# Patient Record
Sex: MALE | Race: BLACK OR AFRICAN AMERICAN
[De-identification: names, ages, dates, MRNs, and addresses within clinical notes are randomized per-mention and may not be internally consistent; named-entity substitution may affect disease eponyms.]

---

## 2022-03-19 PROBLEM — G89.29 CHRONIC BILATERAL LOW BACK PAIN WITH BILATERAL SCIATICA: Status: ACTIVE | Noted: 2021-09-27

## 2022-03-19 PROBLEM — M54.42 CHRONIC BILATERAL LOW BACK PAIN WITH BILATERAL SCIATICA: Status: ACTIVE | Noted: 2021-09-27

## 2022-03-19 PROBLEM — E11.9 CONTROLLED TYPE 2 DIABETES MELLITUS WITHOUT COMPLICATION, WITHOUT LONG-TERM CURRENT USE OF INSULIN (HCC): Status: ACTIVE | Noted: 2021-09-27

## 2022-03-19 PROBLEM — R73.01 IMPAIRED FASTING BLOOD SUGAR: Status: ACTIVE | Noted: 2019-03-16

## 2022-03-19 PROBLEM — N18.2 STAGE 2 CHRONIC KIDNEY DISEASE: Status: ACTIVE | Noted: 2019-03-16

## 2022-03-19 PROBLEM — R60.9 PERIPHERAL EDEMA: Status: ACTIVE | Noted: 2018-04-24

## 2022-03-19 PROBLEM — R60.0 PERIPHERAL EDEMA: Status: ACTIVE | Noted: 2018-04-24

## 2022-03-19 PROBLEM — M54.41 CHRONIC BILATERAL LOW BACK PAIN WITH BILATERAL SCIATICA: Status: ACTIVE | Noted: 2021-09-27

## 2022-03-19 PROBLEM — E66.01 OBESITY, MORBID (HCC): Status: ACTIVE | Noted: 2018-04-24

## 2022-03-19 PROBLEM — I10 ESSENTIAL HYPERTENSION: Status: ACTIVE | Noted: 2018-04-24

## 2022-03-20 PROBLEM — N52.01 ERECTILE DYSFUNCTION DUE TO ARTERIAL INSUFFICIENCY: Status: ACTIVE | Noted: 2019-03-16

## 2022-03-28 DIAGNOSIS — Z12.11 SPECIAL SCREENING FOR MALIGNANT NEOPLASM OF COLON: Primary | ICD-10-CM

## 2022-07-18 RX ORDER — LOSARTAN POTASSIUM 100 MG/1
TABLET ORAL
Qty: 90 TABLET | OUTPATIENT
Start: 2022-07-18

## 2022-09-20 DIAGNOSIS — I10 ESSENTIAL (PRIMARY) HYPERTENSION: ICD-10-CM

## 2022-09-20 DIAGNOSIS — Z13.6 ENCOUNTER FOR LIPID SCREENING FOR CARDIOVASCULAR DISEASE: ICD-10-CM

## 2022-09-20 DIAGNOSIS — Z13.220 ENCOUNTER FOR LIPID SCREENING FOR CARDIOVASCULAR DISEASE: ICD-10-CM

## 2022-09-20 DIAGNOSIS — E11.9 TYPE 2 DIABETES MELLITUS WITHOUT COMPLICATION, WITHOUT LONG-TERM CURRENT USE OF INSULIN (HCC): Primary | ICD-10-CM

## 2022-11-03 DIAGNOSIS — I10 ESSENTIAL (PRIMARY) HYPERTENSION: ICD-10-CM

## 2022-11-03 DIAGNOSIS — Z13.6 ENCOUNTER FOR LIPID SCREENING FOR CARDIOVASCULAR DISEASE: ICD-10-CM

## 2022-11-03 DIAGNOSIS — Z13.220 ENCOUNTER FOR LIPID SCREENING FOR CARDIOVASCULAR DISEASE: ICD-10-CM

## 2022-11-03 DIAGNOSIS — E11.9 TYPE 2 DIABETES MELLITUS WITHOUT COMPLICATION, WITHOUT LONG-TERM CURRENT USE OF INSULIN (HCC): ICD-10-CM

## 2022-11-04 LAB
ALBUMIN SERPL-MCNC: 3.6 G/DL (ref 3.5–5)
ALBUMIN/GLOB SERPL: 0.9 {RATIO} (ref 0.4–1.6)
ALP SERPL-CCNC: 67 U/L (ref 50–136)
ALT SERPL-CCNC: 24 U/L (ref 12–65)
ANION GAP SERPL CALC-SCNC: 8 MMOL/L (ref 2–11)
AST SERPL-CCNC: 15 U/L (ref 15–37)
BASOPHILS # BLD: 0.1 K/UL (ref 0–0.2)
BASOPHILS NFR BLD: 1 % (ref 0–2)
BILIRUB SERPL-MCNC: 0.4 MG/DL (ref 0.2–1.1)
BUN SERPL-MCNC: 14 MG/DL (ref 6–23)
CALCIUM SERPL-MCNC: 9.4 MG/DL (ref 8.3–10.4)
CHLORIDE SERPL-SCNC: 107 MMOL/L (ref 101–110)
CHOLEST SERPL-MCNC: 156 MG/DL
CO2 SERPL-SCNC: 25 MMOL/L (ref 21–32)
CREAT SERPL-MCNC: 1.2 MG/DL (ref 0.8–1.5)
DIFFERENTIAL METHOD BLD: ABNORMAL
EOSINOPHIL # BLD: 0.3 K/UL (ref 0–0.8)
EOSINOPHIL NFR BLD: 2 % (ref 0.5–7.8)
ERYTHROCYTE [DISTWIDTH] IN BLOOD BY AUTOMATED COUNT: 12.4 % (ref 11.9–14.6)
EST. AVERAGE GLUCOSE BLD GHB EST-MCNC: 111 MG/DL
GLOBULIN SER CALC-MCNC: 3.9 G/DL (ref 2.8–4.5)
GLUCOSE SERPL-MCNC: 127 MG/DL (ref 65–100)
HBA1C MFR BLD: 5.5 % (ref 4.8–5.6)
HCT VFR BLD AUTO: 52.5 % (ref 41.1–50.3)
HDLC SERPL-MCNC: 34 MG/DL (ref 40–60)
HDLC SERPL: 4.6 {RATIO}
HGB BLD-MCNC: 15.7 G/DL (ref 13.6–17.2)
IMM GRANULOCYTES # BLD AUTO: 0 K/UL (ref 0–0.5)
IMM GRANULOCYTES NFR BLD AUTO: 0 % (ref 0–5)
LDLC SERPL CALC-MCNC: 101 MG/DL
LYMPHOCYTES # BLD: 2.7 K/UL (ref 0.5–4.6)
LYMPHOCYTES NFR BLD: 27 % (ref 13–44)
MCH RBC QN AUTO: 28.7 PG (ref 26.1–32.9)
MCHC RBC AUTO-ENTMCNC: 29.9 G/DL (ref 31.4–35)
MCV RBC AUTO: 96 FL (ref 82–102)
MONOCYTES # BLD: 0.6 K/UL (ref 0.1–1.3)
MONOCYTES NFR BLD: 6 % (ref 4–12)
NEUTS SEG # BLD: 6.6 K/UL (ref 1.7–8.2)
NEUTS SEG NFR BLD: 64 % (ref 43–78)
NRBC # BLD: 0 K/UL (ref 0–0.2)
PLATELET # BLD AUTO: 279 K/UL (ref 150–450)
PMV BLD AUTO: 11.4 FL (ref 9.4–12.3)
POTASSIUM SERPL-SCNC: 4.1 MMOL/L (ref 3.5–5.1)
PROT SERPL-MCNC: 7.5 G/DL (ref 6.3–8.2)
RBC # BLD AUTO: 5.47 M/UL (ref 4.23–5.6)
SODIUM SERPL-SCNC: 140 MMOL/L (ref 133–143)
TRIGL SERPL-MCNC: 105 MG/DL (ref 35–150)
VLDLC SERPL CALC-MCNC: 21 MG/DL (ref 6–23)
WBC # BLD AUTO: 10.2 K/UL (ref 4.3–11.1)

## 2022-11-28 RX ORDER — LOSARTAN POTASSIUM 100 MG/1
TABLET ORAL
Qty: 90 TABLET | Refills: 0 | Status: SHIPPED | OUTPATIENT
Start: 2022-11-28

## 2022-12-29 RX ORDER — METFORMIN HYDROCHLORIDE 750 MG/1
TABLET, EXTENDED RELEASE ORAL
Qty: 60 TABLET | OUTPATIENT
Start: 2022-12-29

## 2023-02-21 ENCOUNTER — TELEPHONE (OUTPATIENT)
Dept: PRIMARY CARE CLINIC | Facility: CLINIC | Age: 53
End: 2023-02-21

## 2023-02-21 RX ORDER — LOSARTAN POTASSIUM 100 MG/1
TABLET ORAL
Qty: 90 TABLET | Refills: 0 | OUTPATIENT
Start: 2023-02-21

## 2023-02-21 RX ORDER — TADALAFIL 20 MG/1
20 TABLET ORAL PRN
Qty: 30 TABLET | Refills: 2 | Status: SHIPPED | OUTPATIENT
Start: 2023-02-21

## 2023-02-21 NOTE — TELEPHONE ENCOUNTER
----- Message from Corey Dixon sent at 2/17/2023 10:36 PM EST -----  Regarding: Generic cilas  Generic

## 2023-02-21 NOTE — TELEPHONE ENCOUNTER
----- Message from Ernie Mckeon sent at 2/17/2023 10:36 PM EST -----  Regarding: Generic cilas  Generic

## 2023-04-20 RX ORDER — LOSARTAN POTASSIUM 100 MG/1
TABLET ORAL
Qty: 90 TABLET | Refills: 0 | Status: SHIPPED | OUTPATIENT
Start: 2023-04-20

## 2023-07-24 RX ORDER — LOSARTAN POTASSIUM 100 MG/1
TABLET ORAL
Qty: 90 TABLET | Refills: 0 | OUTPATIENT
Start: 2023-07-24

## 2023-09-20 RX ORDER — AMLODIPINE BESYLATE 10 MG/1
TABLET ORAL
Qty: 90 TABLET | OUTPATIENT
Start: 2023-09-20

## 2023-09-28 ENCOUNTER — TELEPHONE (OUTPATIENT)
Dept: PRIMARY CARE CLINIC | Facility: CLINIC | Age: 53
End: 2023-09-28

## 2023-09-28 NOTE — TELEPHONE ENCOUNTER
Patient came into the office he stated that he is out of losartan. Patient missed two appointments, but he is scheduled for January. Patient would also like to have labs. He can be reached at [phone]. Please give him a call.

## 2023-10-02 ENCOUNTER — NURSE ONLY (OUTPATIENT)
Dept: PRIMARY CARE CLINIC | Facility: CLINIC | Age: 53
End: 2023-10-02

## 2023-10-02 DIAGNOSIS — R73.01 IMPAIRED FASTING BLOOD SUGAR: ICD-10-CM

## 2023-10-02 DIAGNOSIS — E66.01 MORBID (SEVERE) OBESITY DUE TO EXCESS CALORIES (HCC): ICD-10-CM

## 2023-10-02 DIAGNOSIS — E66.01 OBESITY, MORBID (HCC): ICD-10-CM

## 2023-10-02 DIAGNOSIS — E11.9 CONTROLLED TYPE 2 DIABETES MELLITUS WITHOUT COMPLICATION, WITHOUT LONG-TERM CURRENT USE OF INSULIN (HCC): ICD-10-CM

## 2023-10-02 DIAGNOSIS — N18.2 STAGE 2 CHRONIC KIDNEY DISEASE: ICD-10-CM

## 2023-10-02 DIAGNOSIS — I10 ESSENTIAL HYPERTENSION: Primary | ICD-10-CM

## 2023-10-02 DIAGNOSIS — I10 ESSENTIAL HYPERTENSION: ICD-10-CM

## 2023-10-02 LAB
ALBUMIN SERPL-MCNC: 3.4 G/DL (ref 3.5–5)
ALBUMIN/GLOB SERPL: 0.7 (ref 0.4–1.6)
ALP SERPL-CCNC: 66 U/L (ref 50–136)
ALT SERPL-CCNC: 28 U/L (ref 12–65)
ANION GAP SERPL CALC-SCNC: 6 MMOL/L (ref 2–11)
AST SERPL-CCNC: 20 U/L (ref 15–37)
BASOPHILS # BLD: 0.1 K/UL (ref 0–0.2)
BASOPHILS NFR BLD: 1 % (ref 0–2)
BILIRUB SERPL-MCNC: 0.5 MG/DL (ref 0.2–1.1)
BUN SERPL-MCNC: 8 MG/DL (ref 6–23)
CALCIUM SERPL-MCNC: 8.9 MG/DL (ref 8.3–10.4)
CHLORIDE SERPL-SCNC: 108 MMOL/L (ref 101–110)
CHOLEST SERPL-MCNC: 172 MG/DL
CO2 SERPL-SCNC: 26 MMOL/L (ref 21–32)
CREAT SERPL-MCNC: 1.2 MG/DL (ref 0.8–1.5)
CREAT UR-MCNC: 120 MG/DL
DIFFERENTIAL METHOD BLD: ABNORMAL
EOSINOPHIL # BLD: 0.3 K/UL (ref 0–0.8)
EOSINOPHIL NFR BLD: 3 % (ref 0.5–7.8)
ERYTHROCYTE [DISTWIDTH] IN BLOOD BY AUTOMATED COUNT: 11.6 % (ref 11.9–14.6)
EST. AVERAGE GLUCOSE BLD GHB EST-MCNC: 126 MG/DL
GLOBULIN SER CALC-MCNC: 4.9 G/DL (ref 2.8–4.5)
GLUCOSE SERPL-MCNC: 142 MG/DL (ref 65–100)
HBA1C MFR BLD: 6 % (ref 4.8–5.6)
HCT VFR BLD AUTO: 47.9 % (ref 41.1–50.3)
HDLC SERPL-MCNC: 30 MG/DL (ref 40–60)
HDLC SERPL: 5.7
HGB BLD-MCNC: 15.6 G/DL (ref 13.6–17.2)
IMM GRANULOCYTES # BLD AUTO: 0 K/UL (ref 0–0.5)
IMM GRANULOCYTES NFR BLD AUTO: 0 % (ref 0–5)
LDLC SERPL CALC-MCNC: 114.2 MG/DL
LYMPHOCYTES # BLD: 2.1 K/UL (ref 0.5–4.6)
LYMPHOCYTES NFR BLD: 22 % (ref 13–44)
MCH RBC QN AUTO: 29.2 PG (ref 26.1–32.9)
MCHC RBC AUTO-ENTMCNC: 32.6 G/DL (ref 31.4–35)
MCV RBC AUTO: 89.5 FL (ref 82–102)
MICROALBUMIN UR-MCNC: 1.06 MG/DL
MICROALBUMIN/CREAT UR-RTO: 9 MG/G (ref 0–30)
MONOCYTES # BLD: 0.5 K/UL (ref 0.1–1.3)
MONOCYTES NFR BLD: 5 % (ref 4–12)
NEUTS SEG # BLD: 6.8 K/UL (ref 1.7–8.2)
NEUTS SEG NFR BLD: 69 % (ref 43–78)
NRBC # BLD: 0 K/UL (ref 0–0.2)
PLATELET # BLD AUTO: 291 K/UL (ref 150–450)
PMV BLD AUTO: 11.2 FL (ref 9.4–12.3)
POTASSIUM SERPL-SCNC: 3.8 MMOL/L (ref 3.5–5.1)
PROT SERPL-MCNC: 8.3 G/DL (ref 6.3–8.2)
RBC # BLD AUTO: 5.35 M/UL (ref 4.23–5.6)
SODIUM SERPL-SCNC: 140 MMOL/L (ref 133–143)
TRIGL SERPL-MCNC: 139 MG/DL (ref 35–150)
VLDLC SERPL CALC-MCNC: 27.8 MG/DL (ref 6–23)
WBC # BLD AUTO: 9.8 K/UL (ref 4.3–11.1)

## 2024-01-02 RX ORDER — LOSARTAN POTASSIUM 100 MG/1
100 TABLET ORAL DAILY
Qty: 90 TABLET | Refills: 0 | Status: SHIPPED | OUTPATIENT
Start: 2024-01-02

## 2024-01-10 ENCOUNTER — OFFICE VISIT (OUTPATIENT)
Dept: PRIMARY CARE CLINIC | Facility: CLINIC | Age: 54
End: 2024-01-10
Payer: MEDICARE

## 2024-01-10 VITALS
HEIGHT: 70 IN | DIASTOLIC BLOOD PRESSURE: 105 MMHG | TEMPERATURE: 97.2 F | SYSTOLIC BLOOD PRESSURE: 184 MMHG | OXYGEN SATURATION: 95 % | BODY MASS INDEX: 42.52 KG/M2 | HEART RATE: 96 BPM | WEIGHT: 297 LBS

## 2024-01-10 DIAGNOSIS — N18.2 STAGE 2 CHRONIC KIDNEY DISEASE: ICD-10-CM

## 2024-01-10 DIAGNOSIS — M54.41 CHRONIC BILATERAL LOW BACK PAIN WITH BILATERAL SCIATICA: ICD-10-CM

## 2024-01-10 DIAGNOSIS — Z12.5 PROSTATE CANCER SCREENING: ICD-10-CM

## 2024-01-10 DIAGNOSIS — J10.1 INFLUENZA B: ICD-10-CM

## 2024-01-10 DIAGNOSIS — E11.9 CONTROLLED TYPE 2 DIABETES MELLITUS WITHOUT COMPLICATION, WITHOUT LONG-TERM CURRENT USE OF INSULIN (HCC): ICD-10-CM

## 2024-01-10 DIAGNOSIS — J06.9 ACUTE URI: ICD-10-CM

## 2024-01-10 DIAGNOSIS — G89.29 CHRONIC BILATERAL LOW BACK PAIN WITH BILATERAL SCIATICA: ICD-10-CM

## 2024-01-10 DIAGNOSIS — M54.42 CHRONIC BILATERAL LOW BACK PAIN WITH BILATERAL SCIATICA: ICD-10-CM

## 2024-01-10 DIAGNOSIS — I10 ESSENTIAL HYPERTENSION: ICD-10-CM

## 2024-01-10 DIAGNOSIS — E66.01 OBESITY, MORBID (HCC): ICD-10-CM

## 2024-01-10 DIAGNOSIS — Z00.00 MEDICARE ANNUAL WELLNESS VISIT, SUBSEQUENT: Primary | ICD-10-CM

## 2024-01-10 LAB
ALBUMIN SERPL-MCNC: 3.4 G/DL (ref 3.5–5)
ALBUMIN/GLOB SERPL: 0.7 (ref 0.4–1.6)
ALP SERPL-CCNC: 71 U/L (ref 50–136)
ALT SERPL-CCNC: 33 U/L (ref 12–65)
ANION GAP SERPL CALC-SCNC: 8 MMOL/L (ref 2–11)
AST SERPL-CCNC: 23 U/L (ref 15–37)
BASOPHILS # BLD: 0 K/UL (ref 0–0.2)
BASOPHILS NFR BLD: 1 % (ref 0–2)
BILIRUB SERPL-MCNC: 0.9 MG/DL (ref 0.2–1.1)
BUN SERPL-MCNC: 11 MG/DL (ref 6–23)
CALCIUM SERPL-MCNC: 8.8 MG/DL (ref 8.3–10.4)
CHLORIDE SERPL-SCNC: 106 MMOL/L (ref 103–113)
CHOLEST SERPL-MCNC: 151 MG/DL
CO2 SERPL-SCNC: 21 MMOL/L (ref 21–32)
CREAT SERPL-MCNC: 1.4 MG/DL (ref 0.8–1.5)
DIFFERENTIAL METHOD BLD: NORMAL
EOSINOPHIL # BLD: 0.1 K/UL (ref 0–0.8)
EOSINOPHIL NFR BLD: 2 % (ref 0.5–7.8)
ERYTHROCYTE [DISTWIDTH] IN BLOOD BY AUTOMATED COUNT: 12 % (ref 11.9–14.6)
EXP DATE SOLUTION: NORMAL
EXP DATE SWAB: NORMAL
EXPIRATION DATE: NORMAL
GLOBULIN SER CALC-MCNC: 5.2 G/DL (ref 2.8–4.5)
GLUCOSE SERPL-MCNC: 162 MG/DL (ref 65–100)
HCT VFR BLD AUTO: 46.4 % (ref 41.1–50.3)
HDLC SERPL: 5.6
HGB BLD-MCNC: 15.1 G/DL (ref 13.6–17.2)
IMM GRANULOCYTES # BLD AUTO: 0 K/UL (ref 0–0.5)
IMM GRANULOCYTES NFR BLD AUTO: 0 % (ref 0–5)
INFLUENZA A ANTIGEN, POC: NEGATIVE
INFLUENZA B ANTIGEN, POC: POSITIVE
LDLC SERPL CALC-MCNC: 102.6 MG/DL
LOT NUMBER POC: NORMAL
LOT NUMBER SOLUTION: NORMAL
LOT NUMBER SWAB: NORMAL
LYMPHOCYTES # BLD: 1.2 K/UL (ref 0.5–4.6)
LYMPHOCYTES NFR BLD: 16 % (ref 13–44)
MCH RBC QN AUTO: 29 PG (ref 26.1–32.9)
MCHC RBC AUTO-ENTMCNC: 32.5 G/DL (ref 31.4–35)
MCV RBC AUTO: 89.2 FL (ref 82–102)
MONOCYTES # BLD: 0.7 K/UL (ref 0.1–1.3)
MONOCYTES NFR BLD: 8 % (ref 4–12)
NEUTS SEG # BLD: 5.7 K/UL (ref 1.7–8.2)
NEUTS SEG NFR BLD: 73 % (ref 43–78)
NRBC # BLD: 0 K/UL (ref 0–0.2)
PLATELET # BLD AUTO: 241 K/UL (ref 150–450)
PMV BLD AUTO: 11 FL (ref 9.4–12.3)
POTASSIUM SERPL-SCNC: 3.8 MMOL/L (ref 3.5–5.1)
PROT SERPL-MCNC: 8.6 G/DL (ref 6.3–8.2)
PSA SERPL-MCNC: 1.6 NG/ML
RBC # BLD AUTO: 5.2 M/UL (ref 4.23–5.6)
SARS-COV-2 RNA, POC: NEGATIVE
SODIUM SERPL-SCNC: 135 MMOL/L (ref 136–146)
TRIGL SERPL-MCNC: 107 MG/DL (ref 35–150)
VALID INTERNAL CONTROL, POC: ABNORMAL
VLDLC SERPL CALC-MCNC: 21.4 MG/DL (ref 6–23)
WBC # BLD AUTO: 7.7 K/UL (ref 4.3–11.1)

## 2024-01-10 PROCEDURE — G0439 PPPS, SUBSEQ VISIT: HCPCS | Performed by: FAMILY MEDICINE

## 2024-01-10 PROCEDURE — 87804 INFLUENZA ASSAY W/OPTIC: CPT | Performed by: FAMILY MEDICINE

## 2024-01-10 PROCEDURE — 87635 SARS-COV-2 COVID-19 AMP PRB: CPT | Performed by: FAMILY MEDICINE

## 2024-01-10 PROCEDURE — 99213 OFFICE O/P EST LOW 20 MIN: CPT | Performed by: FAMILY MEDICINE

## 2024-01-10 PROCEDURE — 3080F DIAST BP >= 90 MM HG: CPT | Performed by: FAMILY MEDICINE

## 2024-01-10 PROCEDURE — 3077F SYST BP >= 140 MM HG: CPT | Performed by: FAMILY MEDICINE

## 2024-01-10 RX ORDER — LOSARTAN POTASSIUM 100 MG/1
100 TABLET ORAL DAILY
Qty: 90 TABLET | Refills: 1 | Status: SHIPPED | OUTPATIENT
Start: 2024-01-10

## 2024-01-10 RX ORDER — OSELTAMIVIR PHOSPHATE 75 MG/1
75 CAPSULE ORAL 2 TIMES DAILY
Qty: 10 CAPSULE | Refills: 0 | Status: SHIPPED | OUTPATIENT
Start: 2024-01-10 | End: 2024-01-15

## 2024-01-10 RX ORDER — AMLODIPINE BESYLATE 10 MG/1
10 TABLET ORAL DAILY
Qty: 90 TABLET | Refills: 1 | Status: SHIPPED | OUTPATIENT
Start: 2024-01-10

## 2024-01-10 ASSESSMENT — PATIENT HEALTH QUESTIONNAIRE - PHQ9
SUM OF ALL RESPONSES TO PHQ QUESTIONS 1-9: 0
SUM OF ALL RESPONSES TO PHQ QUESTIONS 1-9: 0
1. LITTLE INTEREST OR PLEASURE IN DOING THINGS: 0
2. FEELING DOWN, DEPRESSED OR HOPELESS: 0
SUM OF ALL RESPONSES TO PHQ9 QUESTIONS 1 & 2: 0
SUM OF ALL RESPONSES TO PHQ QUESTIONS 1-9: 0
SUM OF ALL RESPONSES TO PHQ QUESTIONS 1-9: 0

## 2024-01-10 ASSESSMENT — LIFESTYLE VARIABLES
HOW OFTEN DO YOU HAVE A DRINK CONTAINING ALCOHOL: NEVER
HOW MANY STANDARD DRINKS CONTAINING ALCOHOL DO YOU HAVE ON A TYPICAL DAY: PATIENT DOES NOT DRINK

## 2024-01-10 NOTE — PROGRESS NOTES
Medicare Annual Wellness Visit    Soto Townsend is here for Medicare AWV (Patient is here for a Medicare Wellness.), Hypertension (Patient is here to follow up and get refills.), Erectile Dysfunction (Patient would like to get a prescription for /sildenafil instead of Cialis.), Labs Only (Patient is fasting for labs.), and URI (Patient has a running nose, coughing and sneezing since yesterday. Patient has been taking Coricidin cough medication, with no relief.)    Assessment & Plan   Medicare annual wellness visit, subsequent  Essential hypertension  -     losartan (COZAAR) 100 MG tablet; Take 1 tablet by mouth daily, Disp-90 tablet, R-1Normal  -     amLODIPine (NORVASC) 10 MG tablet; Take 1 tablet by mouth daily TAKE 1 TABLET BY MOUTH DAILY, Disp-90 tablet, R-1Normal  -     CBC with Auto Differential; Future  -     Comprehensive Metabolic Panel; Future  -     Lipid Panel; Future  Controlled type 2 diabetes mellitus without complication, without long-term current use of insulin (HCC)  -     Hemoglobin A1C; Future  -     Lipid Panel; Future  Stage 2 chronic kidney disease  Influenza B  -     oseltamivir (TAMIFLU) 75 MG capsule; Take 1 capsule by mouth 2 times daily for 5 days, Disp-10 capsule, R-0Normal  Chronic bilateral low back pain with bilateral sciatica  Obesity, morbid (HCC)  Acute URI  -     AMB POC RAPID INFLUENZA TEST  -     AMB POC COVID-19 COV  Prostate cancer screening  -     PSA Screening; Future      We are checking labs today.  Will follow-up.  To review and make changes to medications.  Blood pressure is quite elevated.  It is unclear if this is due to cough medications or if his blood pressure is truly elevated.  He will discontinue over-the-counter cough medications.  Will take losartan and amlodipine as prescribed and recheck his blood pressure and 4 weeks.  Patient is positive for influenza A.  I am sending in Tamiflu.  Will treat otherwise symptomatically.  Call if symptoms do not

## 2024-01-10 NOTE — PATIENT INSTRUCTIONS
home?  Keeping your body active can help slow MCI. Exercises like walking can help. Try to stay active mentally too. Read or do things like crossword puzzles if you enjoy doing them.  If you need help coping with MCI, you may want to get support from family, friends, a support group, or a counselor who works with people who have MCI.  Though the future isn't always clear, it can be good to plan ahead with instructions for your care. These are called advanced directives. Having a plan can help make sure that you get the care you want.  Current as of: May 1, 2023               Content Version: 13.9  © 2006-2023 [employer].   Care instructions adapted under license by [employer]. If you have questions about a medical condition or this instruction, always ask your healthcare professional. [employer] disclaims any warranty or liability for your use of this information.           Starting a Weight Loss Plan: Care Instructions  Overview     If you're thinking about losing weight, it can be hard to know where to start. Your doctor can help you set up a weight loss plan that best meets your needs. You may want to take a class on nutrition or exercise, or you could join a weight loss support group. If you have questions about how to make changes to your eating or exercise habits, ask your doctor about seeing a registered dietitian or an exercise specialist.  It can be a big challenge to lose weight. But you don't have to make huge changes at once. Make small changes, and stick with them. When those changes become habit, add a few more changes.  If you don't think you're ready to make changes right now, try to pick a date in the future. Make an appointment to see your doctor to discuss whether the time is right for you to start a plan.  Follow-up care is a key part of your treatment and safety. Be sure to make and go to all appointments, and call your doctor if you are having problems. It's also a

## 2024-01-11 LAB
EST. AVERAGE GLUCOSE BLD GHB EST-MCNC: 140 MG/DL
HBA1C MFR BLD: 6.5 % (ref 4.8–5.6)

## 2024-03-07 ENCOUNTER — HOSPITAL ENCOUNTER (OUTPATIENT)
Dept: GENERAL RADIOLOGY | Age: 54
Discharge: HOME | End: 2024-03-07
Payer: MEDICARE

## 2024-03-07 ENCOUNTER — OFFICE VISIT (OUTPATIENT)
Dept: PRIMARY CARE CLINIC | Facility: CLINIC | Age: 54
End: 2024-03-07
Payer: MEDICARE

## 2024-03-07 VITALS
HEART RATE: 77 BPM | TEMPERATURE: 98.4 F | HEIGHT: 70 IN | SYSTOLIC BLOOD PRESSURE: 155 MMHG | WEIGHT: 299 LBS | OXYGEN SATURATION: 98 % | DIASTOLIC BLOOD PRESSURE: 87 MMHG | BODY MASS INDEX: 42.8 KG/M2

## 2024-03-07 DIAGNOSIS — I10 ESSENTIAL HYPERTENSION: ICD-10-CM

## 2024-03-07 DIAGNOSIS — R05.3 CHRONIC COUGH: Primary | ICD-10-CM

## 2024-03-07 DIAGNOSIS — R05.3 CHRONIC COUGH: ICD-10-CM

## 2024-03-07 DIAGNOSIS — Z12.11 SCREENING FOR MALIGNANT NEOPLASM OF COLON: ICD-10-CM

## 2024-03-07 DIAGNOSIS — N52.01 ERECTILE DYSFUNCTION DUE TO ARTERIAL INSUFFICIENCY: ICD-10-CM

## 2024-03-07 PROCEDURE — 71046 X-RAY EXAM CHEST 2 VIEWS: CPT

## 2024-03-07 PROCEDURE — 3079F DIAST BP 80-89 MM HG: CPT | Performed by: FAMILY MEDICINE

## 2024-03-07 PROCEDURE — 99214 OFFICE O/P EST MOD 30 MIN: CPT | Performed by: FAMILY MEDICINE

## 2024-03-07 PROCEDURE — 3077F SYST BP >= 140 MM HG: CPT | Performed by: FAMILY MEDICINE

## 2024-03-07 RX ORDER — M-VIT,TX,IRON,MINS/CALC/FOLIC 27MG-0.4MG
1 TABLET ORAL DAILY
COMMUNITY

## 2024-03-07 RX ORDER — CODEINE PHOSPHATE/GUAIFENESIN 10-100MG/5
5 LIQUID (ML) ORAL 2 TIMES DAILY PRN
Qty: 120 ML | Refills: 0 | Status: SHIPPED | OUTPATIENT
Start: 2024-03-07 | End: 2024-03-19

## 2024-03-07 RX ORDER — AZITHROMYCIN 250 MG/1
250 TABLET, FILM COATED ORAL SEE ADMIN INSTRUCTIONS
Qty: 6 TABLET | Refills: 0 | Status: SHIPPED | OUTPATIENT
Start: 2024-03-07 | End: 2024-03-12

## 2024-03-07 RX ORDER — TADALAFIL 20 MG/1
20 TABLET ORAL PRN
Qty: 10 TABLET | Refills: 5 | Status: SHIPPED | OUTPATIENT
Start: 2024-03-07

## 2024-03-07 RX ORDER — LOSARTAN POTASSIUM AND HYDROCHLOROTHIAZIDE 25; 100 MG/1; MG/1
1 TABLET ORAL DAILY
Qty: 30 TABLET | Refills: 5 | Status: SHIPPED | OUTPATIENT
Start: 2024-03-07

## 2024-03-07 SDOH — ECONOMIC STABILITY: INCOME INSECURITY: HOW HARD IS IT FOR YOU TO PAY FOR THE VERY BASICS LIKE FOOD, HOUSING, MEDICAL CARE, AND HEATING?: PATIENT DECLINED

## 2024-03-07 SDOH — ECONOMIC STABILITY: HOUSING INSECURITY
IN THE LAST 12 MONTHS, WAS THERE A TIME WHEN YOU DID NOT HAVE A STEADY PLACE TO SLEEP OR SLEPT IN A SHELTER (INCLUDING NOW)?: PATIENT DECLINED

## 2024-03-07 SDOH — ECONOMIC STABILITY: FOOD INSECURITY: WITHIN THE PAST 12 MONTHS, YOU WORRIED THAT YOUR FOOD WOULD RUN OUT BEFORE YOU GOT MONEY TO BUY MORE.: PATIENT DECLINED

## 2024-03-07 SDOH — ECONOMIC STABILITY: FOOD INSECURITY: WITHIN THE PAST 12 MONTHS, THE FOOD YOU BOUGHT JUST DIDN'T LAST AND YOU DIDN'T HAVE MONEY TO GET MORE.: PATIENT DECLINED

## 2024-03-07 ASSESSMENT — PATIENT HEALTH QUESTIONNAIRE - PHQ9
2. FEELING DOWN, DEPRESSED OR HOPELESS: 0
SUM OF ALL RESPONSES TO PHQ QUESTIONS 1-9: 0
SUM OF ALL RESPONSES TO PHQ9 QUESTIONS 1 & 2: 0
1. LITTLE INTEREST OR PLEASURE IN DOING THINGS: 0
SUM OF ALL RESPONSES TO PHQ QUESTIONS 1-9: 0

## 2024-03-07 ASSESSMENT — ENCOUNTER SYMPTOMS: COUGH: 1

## 2024-03-07 NOTE — PROGRESS NOTES
Soto Townsend (:  1970) is a 53 y.o. male,Established patient, here for evaluation of the following chief complaint(s):  Cough         ASSESSMENT/PLAN:  1. Chronic cough  -     XR CHEST PA LAT (2 VIEWS); Future  -     guaiFENesin-codeine (TUSSI-ORGANIDIN NR) 100-10 MG/5ML syrup; Take 5 mLs by mouth 2 times daily as needed for Cough for up to 12 days. Max Daily Amount: 10 mLs, Disp-120 mL, R-0Normal  -     azithromycin (ZITHROMAX) 250 MG tablet; Take 1 tablet by mouth See Admin Instructions for 5 days 500mg on day 1 followed by 250mg on days 2 - 5, Disp-6 tablet, R-0Normal  2. Essential hypertension  -     losartan-hydroCHLOROthiazide (HYZAAR) 100-25 MG per tablet; Take 1 tablet by mouth daily, Disp-30 tablet, R-5Normal  3. Screening for malignant neoplasm of colon  -     Cologuard (Fecal DNA Colorectal Cancer Screening)  4. Erectile dysfunction due to arterial insufficiency  -     tadalafil (CIALIS) 20 MG tablet; Take 1 tablet by mouth as needed for Erectile Dysfunction, Disp-10 tablet, R-5Normal    I am getting a chest x-ray given the prolonged nature of his cough.  I am also starting him on Cheratussin cough liquid 1 teaspoon twice daily as needed for cough.  I also started the Z-Morro.  Will follow-up here if no improvement is noted.  Will restart hydrochlorothiazide 25 mg once daily.  Continue with losartan and with amlodipine.  Recheck his blood pressure here in 2 to 3 weeks.  Monitor at home as well.      Return in about 3 months (around 2024) for bp check in 2 weeks, labs 1 week prior to visit.         Subjective   SUBJECTIVE/OBJECTIVE:  Cough  This is a chronic problem. The current episode started more than 1 month ago. The problem has been unchanged. The problem occurs constantly. The cough is Non-productive. Nothing aggravates the symptoms. Treatments tried: Tessalon Perles. Started after he had the flu.   He denies fever or chills.  He indicates the cough is nonproductive.  He states that it

## 2024-03-21 ENCOUNTER — NURSE ONLY (OUTPATIENT)
Dept: PRIMARY CARE CLINIC | Facility: CLINIC | Age: 54
End: 2024-03-21

## 2024-03-21 VITALS — HEART RATE: 89 BPM | DIASTOLIC BLOOD PRESSURE: 89 MMHG | SYSTOLIC BLOOD PRESSURE: 145 MMHG

## 2024-03-21 RX ORDER — HYDROCHLOROTHIAZIDE 12.5 MG/1
12.5 CAPSULE, GELATIN COATED ORAL EVERY MORNING
Qty: 30 CAPSULE | Refills: 5 | Status: SHIPPED | OUTPATIENT
Start: 2024-03-21

## 2024-03-21 NOTE — PROGRESS NOTES
Patient took his medication around at 9:00am. He takes Amlodipine 10mg. And Losartan-HCTZ 100-25. The Losartan-HCTZ was cramping him up at night so he went back on Losartan 100mg two days ago.  Per Dr. Carlos send in a 12.5 mg of HCTZ.

## 2024-04-04 ENCOUNTER — NURSE ONLY (OUTPATIENT)
Dept: PRIMARY CARE CLINIC | Facility: CLINIC | Age: 54
End: 2024-04-04
Payer: MEDICARE

## 2024-04-04 VITALS — HEART RATE: 80 BPM | DIASTOLIC BLOOD PRESSURE: 91 MMHG | SYSTOLIC BLOOD PRESSURE: 148 MMHG

## 2024-04-04 PROCEDURE — 3080F DIAST BP >= 90 MM HG: CPT | Performed by: FAMILY MEDICINE

## 2024-04-04 PROCEDURE — 99212 OFFICE O/P EST SF 10 MIN: CPT | Performed by: FAMILY MEDICINE

## 2024-04-04 PROCEDURE — 3077F SYST BP >= 140 MM HG: CPT | Performed by: FAMILY MEDICINE

## 2024-04-04 RX ORDER — LOSARTAN POTASSIUM AND HYDROCHLOROTHIAZIDE 12.5; 1 MG/1; MG/1
1 TABLET ORAL DAILY
COMMUNITY

## 2024-04-04 NOTE — PROGRESS NOTES
Patient took his medication around 8:40 am Patient currently takes 100-12.5 mg of Losartan-HCTZ along with 10 mg of Amlodipine. He states he has a constant low right back pain and wonders if that could be affecting his blood pressure.He states the pain is in the morning or when he stands for a while. Once he starts walking his pain goes away. His pain now sitting is a 3 on the pain scale.  Per Dr. Carlos change his medication to Tribenzor 40-10-12.5 and come back in two weeks for a blood pressure check.

## 2024-04-08 RX ORDER — OLMESARTAN MEDOXOMIL / AMLODIPINE BESYLATE / HYDROCHLOROTHIAZIDE 40; 10; 12.5 MG/1; MG/1; MG/1
1 TABLET, FILM COATED ORAL DAILY
Qty: 30 TABLET | Refills: 5 | Status: SHIPPED | OUTPATIENT
Start: 2024-04-08

## 2024-06-03 DIAGNOSIS — E11.9 CONTROLLED TYPE 2 DIABETES MELLITUS WITHOUT COMPLICATION, WITHOUT LONG-TERM CURRENT USE OF INSULIN (HCC): ICD-10-CM

## 2024-06-03 DIAGNOSIS — I10 ESSENTIAL HYPERTENSION: ICD-10-CM

## 2024-06-03 DIAGNOSIS — I10 ESSENTIAL HYPERTENSION: Primary | ICD-10-CM

## 2024-06-03 LAB
ALBUMIN SERPL-MCNC: 3.5 G/DL (ref 3.5–5)
ALBUMIN/GLOB SERPL: 0.8 (ref 1–1.9)
ALP SERPL-CCNC: 69 U/L (ref 40–129)
ALT SERPL-CCNC: 17 U/L (ref 12–65)
ANION GAP SERPL CALC-SCNC: 11 MMOL/L (ref 9–18)
AST SERPL-CCNC: 25 U/L (ref 15–37)
BASOPHILS # BLD: 0 K/UL (ref 0–0.2)
BASOPHILS NFR BLD: 1 % (ref 0–2)
BILIRUB SERPL-MCNC: 0.5 MG/DL (ref 0–1.2)
BUN SERPL-MCNC: 11 MG/DL (ref 6–23)
CALCIUM SERPL-MCNC: 9.1 MG/DL (ref 8.8–10.2)
CHLORIDE SERPL-SCNC: 103 MMOL/L (ref 98–107)
CHOLEST SERPL-MCNC: 168 MG/DL (ref 0–200)
CO2 SERPL-SCNC: 24 MMOL/L (ref 20–28)
CREAT SERPL-MCNC: 1.27 MG/DL (ref 0.8–1.3)
DIFFERENTIAL METHOD BLD: ABNORMAL
EOSINOPHIL # BLD: 0.3 K/UL (ref 0–0.8)
EOSINOPHIL NFR BLD: 3 % (ref 0.5–7.8)
ERYTHROCYTE [DISTWIDTH] IN BLOOD BY AUTOMATED COUNT: 11.7 % (ref 11.9–14.6)
EST. AVERAGE GLUCOSE BLD GHB EST-MCNC: 192 MG/DL
GLOBULIN SER CALC-MCNC: 4.2 G/DL (ref 2.3–3.5)
GLUCOSE SERPL-MCNC: 202 MG/DL (ref 70–99)
HBA1C MFR BLD: 8.3 % (ref 0–5.6)
HCT VFR BLD AUTO: 45.9 % (ref 41.1–50.3)
HDLC SERPL-MCNC: 27 MG/DL (ref 40–60)
HDLC SERPL: 6.3 (ref 0–5)
HGB BLD-MCNC: 15 G/DL (ref 13.6–17.2)
IMM GRANULOCYTES # BLD AUTO: 0 K/UL (ref 0–0.5)
IMM GRANULOCYTES NFR BLD AUTO: 0 % (ref 0–5)
LDLC SERPL CALC-MCNC: 116 MG/DL (ref 0–100)
LYMPHOCYTES # BLD: 2.7 K/UL (ref 0.5–4.6)
LYMPHOCYTES NFR BLD: 33 % (ref 13–44)
MCH RBC QN AUTO: 29.4 PG (ref 26.1–32.9)
MCHC RBC AUTO-ENTMCNC: 32.7 G/DL (ref 31.4–35)
MCV RBC AUTO: 89.8 FL (ref 82–102)
MONOCYTES # BLD: 0.5 K/UL (ref 0.1–1.3)
MONOCYTES NFR BLD: 6 % (ref 4–12)
NEUTS SEG # BLD: 4.8 K/UL (ref 1.7–8.2)
NEUTS SEG NFR BLD: 57 % (ref 43–78)
NRBC # BLD: 0 K/UL (ref 0–0.2)
PLATELET # BLD AUTO: 274 K/UL (ref 150–450)
PMV BLD AUTO: 11.4 FL (ref 9.4–12.3)
POTASSIUM SERPL-SCNC: 3.9 MMOL/L (ref 3.5–5.1)
PROT SERPL-MCNC: 7.7 G/DL (ref 6.3–8.2)
RBC # BLD AUTO: 5.11 M/UL (ref 4.23–5.6)
SODIUM SERPL-SCNC: 137 MMOL/L (ref 136–145)
TRIGL SERPL-MCNC: 127 MG/DL (ref 0–150)
VLDLC SERPL CALC-MCNC: 25 MG/DL (ref 6–23)
WBC # BLD AUTO: 8.4 K/UL (ref 4.3–11.1)

## 2024-06-10 ENCOUNTER — OFFICE VISIT (OUTPATIENT)
Dept: PRIMARY CARE CLINIC | Facility: CLINIC | Age: 54
End: 2024-06-10
Payer: MEDICARE

## 2024-06-10 VITALS
OXYGEN SATURATION: 94 % | WEIGHT: 301 LBS | DIASTOLIC BLOOD PRESSURE: 85 MMHG | HEIGHT: 70 IN | HEART RATE: 82 BPM | SYSTOLIC BLOOD PRESSURE: 138 MMHG | BODY MASS INDEX: 43.09 KG/M2 | TEMPERATURE: 97.8 F

## 2024-06-10 DIAGNOSIS — I10 ESSENTIAL HYPERTENSION: ICD-10-CM

## 2024-06-10 DIAGNOSIS — E66.01 OBESITY, MORBID (HCC): ICD-10-CM

## 2024-06-10 DIAGNOSIS — E11.65 TYPE 2 DIABETES MELLITUS WITH HYPERGLYCEMIA, WITHOUT LONG-TERM CURRENT USE OF INSULIN (HCC): Primary | ICD-10-CM

## 2024-06-10 PROBLEM — E11.9 CONTROLLED TYPE 2 DIABETES MELLITUS WITHOUT COMPLICATION, WITHOUT LONG-TERM CURRENT USE OF INSULIN (HCC): Status: RESOLVED | Noted: 2021-09-27 | Resolved: 2024-06-10

## 2024-06-10 PROBLEM — R73.01 IMPAIRED FASTING BLOOD SUGAR: Status: RESOLVED | Noted: 2019-03-16 | Resolved: 2024-06-10

## 2024-06-10 PROCEDURE — 3075F SYST BP GE 130 - 139MM HG: CPT | Performed by: FAMILY MEDICINE

## 2024-06-10 PROCEDURE — 3052F HG A1C>EQUAL 8.0%<EQUAL 9.0%: CPT | Performed by: FAMILY MEDICINE

## 2024-06-10 PROCEDURE — 99214 OFFICE O/P EST MOD 30 MIN: CPT | Performed by: FAMILY MEDICINE

## 2024-06-10 PROCEDURE — 3079F DIAST BP 80-89 MM HG: CPT | Performed by: FAMILY MEDICINE

## 2024-06-10 RX ORDER — SILDENAFIL 100 MG/1
100 TABLET, FILM COATED ORAL DAILY PRN
Qty: 30 TABLET | Refills: 1 | Status: SHIPPED | OUTPATIENT
Start: 2024-06-10

## 2024-06-10 ASSESSMENT — PATIENT HEALTH QUESTIONNAIRE - PHQ9
2. FEELING DOWN, DEPRESSED OR HOPELESS: NOT AT ALL
1. LITTLE INTEREST OR PLEASURE IN DOING THINGS: NOT AT ALL
SUM OF ALL RESPONSES TO PHQ QUESTIONS 1-9: 0
SUM OF ALL RESPONSES TO PHQ9 QUESTIONS 1 & 2: 0
SUM OF ALL RESPONSES TO PHQ QUESTIONS 1-9: 0

## 2024-06-10 NOTE — PROGRESS NOTES
[facility]  Jagdish Carlos M.D.  [address] [place] [address] [place] [address]  Ph No:  [phone]  Fax:  [phone]    CHIEF COMPLAINT:  Chief Complaint   Patient presents with    Hypertension     Patient is here to follow up to Hypertension.    Trigger finger     Patient presents with right third digit finger that sticks and he has to pop it back out.    Discuss Labs    Erectile Dysfunction     Patient would like to get a prescription for Sildenafil.          IMPRESSION/PLAN    1. Type 2 diabetes mellitus with hyperglycemia, without long-term current use of insulin (HCC)  2. Essential hypertension  3. Obesity, morbid (HCC)      Diabetes is uncontrolled.  He wishes to try diet and exercise rather than restarting medications.  Will recheck A1c in 3 months.  If elevated we will start metformin.  He is at risk for heart disease.  He has low HDL and high LDL.  Needs to be on a statin.  He is declining.  Will readdress when he returns.  Instructed pt to engage in regular aerobic exercise at least three to four times per week for at least 30 mins.  (150 mins)  Also encourage to reduce total daily calorie intake by 30%, reduce carbohydrate and fat intake.  Blood pressure is well-controlled continue with current medication.    We discussed the expected course, resolution and complications of the diagnosis(es) in detail.  Medication risks, benefits, costs, interactions, and alternatives were discussed as indicated.  I advised pt to contact the office if condition worsens, changes or fails to improve as anticipated. Pt expressed understanding with the diagnosis(es) and plan.       HISTORY OF PRESENT ILLNESS:  Patient here for follow-up of hypertension and diabetes.  He states that he has been intermittent fasting to try to lose weight.  He only eats between 12 noon and 8 PM but indicates that he does snack on grapes throughout the day.  He was unaware that great for culture his blood sugar go up.  He has

## 2024-09-23 RX ORDER — OLMESARTAN MEDOXOMIL / AMLODIPINE BESYLATE / HYDROCHLOROTHIAZIDE 40; 10; 12.5 MG/1; MG/1; MG/1
1 TABLET, FILM COATED ORAL DAILY
Qty: 30 TABLET | Refills: 5 | OUTPATIENT
Start: 2024-09-23

## 2025-04-04 ENCOUNTER — TELEPHONE (OUTPATIENT)
Dept: PRIMARY CARE CLINIC | Facility: CLINIC | Age: 55
End: 2025-04-04

## 2025-04-04 NOTE — TELEPHONE ENCOUNTER
----- Message from Dereck CHEATHAM sent at 4/3/2025  4:26 PM EDT -----  Regarding: [facility] Referral Request  [facility] Referral Request    Reason for referral request: Lab/Test Order    Specialist/Lab/Test patient is requesting (if known):    Specialist Phone Number (if applicable):    Additional Information Patient wants to schedule a lab visit  --------------------------------------------------------------------------------------------------------------------------    Relationship to Patient: Self     Call Back Information: OK to leave message on voicemail  Preferred Call Back Number: Phone [phone]

## 2025-06-05 ENCOUNTER — LAB (OUTPATIENT)
Dept: PRIMARY CARE CLINIC | Facility: CLINIC | Age: 55
End: 2025-06-05

## 2025-06-05 DIAGNOSIS — I10 ESSENTIAL HYPERTENSION: ICD-10-CM

## 2025-06-05 DIAGNOSIS — Z12.5 PROSTATE CANCER SCREENING: ICD-10-CM

## 2025-06-05 DIAGNOSIS — E11.65 TYPE 2 DIABETES MELLITUS WITH HYPERGLYCEMIA, WITHOUT LONG-TERM CURRENT USE OF INSULIN (HCC): Primary | ICD-10-CM

## 2025-06-05 DIAGNOSIS — E11.65 TYPE 2 DIABETES MELLITUS WITH HYPERGLYCEMIA, WITHOUT LONG-TERM CURRENT USE OF INSULIN (HCC): ICD-10-CM

## 2025-06-05 LAB
ALBUMIN SERPL-MCNC: 3.4 G/DL (ref 3.5–5)
ALBUMIN/GLOB SERPL: 0.8 (ref 1–1.9)
ALP SERPL-CCNC: 87 U/L (ref 40–129)
ALT SERPL-CCNC: 16 U/L (ref 8–55)
ANION GAP SERPL CALC-SCNC: 10 MMOL/L (ref 7–16)
AST SERPL-CCNC: 22 U/L (ref 15–37)
BASOPHILS # BLD: 0.06 K/UL (ref 0–0.2)
BASOPHILS NFR BLD: 0.7 % (ref 0–2)
BILIRUB SERPL-MCNC: 0.7 MG/DL (ref 0–1.2)
BUN SERPL-MCNC: 10 MG/DL (ref 6–23)
CALCIUM SERPL-MCNC: 8.9 MG/DL (ref 8.8–10.2)
CHLORIDE SERPL-SCNC: 103 MMOL/L (ref 98–107)
CHOLEST SERPL-MCNC: 151 MG/DL (ref 0–200)
CO2 SERPL-SCNC: 24 MMOL/L (ref 20–29)
CREAT SERPL-MCNC: 1.16 MG/DL (ref 0.8–1.3)
CREAT UR-MCNC: 137 MG/DL (ref 39–259)
DIFFERENTIAL METHOD BLD: ABNORMAL
EOSINOPHIL # BLD: 0.32 K/UL (ref 0–0.8)
EOSINOPHIL NFR BLD: 3.5 % (ref 0.5–7.8)
ERYTHROCYTE [DISTWIDTH] IN BLOOD BY AUTOMATED COUNT: 11.7 % (ref 11.9–14.6)
EST. AVERAGE GLUCOSE BLD GHB EST-MCNC: 129 MG/DL
GLOBULIN SER CALC-MCNC: 4.4 G/DL (ref 2.3–3.5)
GLUCOSE SERPL-MCNC: 145 MG/DL (ref 70–99)
HBA1C MFR BLD: 6.1 % (ref 0–5.6)
HCT VFR BLD AUTO: 46.3 % (ref 41.1–50.3)
HDLC SERPL-MCNC: 27 MG/DL (ref 40–60)
HDLC SERPL: 5.7 (ref 0–5)
HGB BLD-MCNC: 15 G/DL (ref 13.6–17.2)
IMM GRANULOCYTES # BLD AUTO: 0.02 K/UL (ref 0–0.5)
IMM GRANULOCYTES NFR BLD AUTO: 0.2 % (ref 0–5)
LDLC SERPL CALC-MCNC: 103 MG/DL (ref 0–100)
LYMPHOCYTES # BLD: 2.83 K/UL (ref 0.5–4.6)
LYMPHOCYTES NFR BLD: 30.8 % (ref 13–44)
MCH RBC QN AUTO: 28.9 PG (ref 26.1–32.9)
MCHC RBC AUTO-ENTMCNC: 32.4 G/DL (ref 31.4–35)
MCV RBC AUTO: 89.2 FL (ref 82–102)
MICROALBUMIN UR-MCNC: <1.2 MG/DL (ref 0–20)
MICROALBUMIN/CREAT UR-RTO: NORMAL MG/G (ref 0–30)
MONOCYTES # BLD: 0.51 K/UL (ref 0.1–1.3)
MONOCYTES NFR BLD: 5.5 % (ref 4–12)
NEUTS SEG # BLD: 5.45 K/UL (ref 1.7–8.2)
NEUTS SEG NFR BLD: 59.3 % (ref 43–78)
NRBC # BLD: 0 K/UL (ref 0–0.2)
PLATELET # BLD AUTO: 274 K/UL (ref 150–450)
PMV BLD AUTO: 11.4 FL (ref 9.4–12.3)
POTASSIUM SERPL-SCNC: 3.8 MMOL/L (ref 3.5–5.1)
PROT SERPL-MCNC: 7.8 G/DL (ref 6.3–8.2)
PSA SERPL-MCNC: 1.3 NG/ML (ref 0–4)
RBC # BLD AUTO: 5.19 M/UL (ref 4.23–5.6)
SODIUM SERPL-SCNC: 137 MMOL/L (ref 136–145)
TRIGL SERPL-MCNC: 108 MG/DL (ref 0–150)
VLDLC SERPL CALC-MCNC: 22 MG/DL (ref 6–23)
WBC # BLD AUTO: 9.2 K/UL (ref 4.3–11.1)

## 2025-06-12 ENCOUNTER — OFFICE VISIT (OUTPATIENT)
Dept: PRIMARY CARE CLINIC | Facility: CLINIC | Age: 55
End: 2025-06-12

## 2025-06-12 VITALS
HEART RATE: 102 BPM | DIASTOLIC BLOOD PRESSURE: 112 MMHG | WEIGHT: 284.56 LBS | OXYGEN SATURATION: 95 % | SYSTOLIC BLOOD PRESSURE: 192 MMHG | TEMPERATURE: 97.8 F | BODY MASS INDEX: 42.15 KG/M2 | HEIGHT: 69 IN

## 2025-06-12 DIAGNOSIS — Z00.00 MEDICARE ANNUAL WELLNESS VISIT, SUBSEQUENT: Primary | ICD-10-CM

## 2025-06-12 DIAGNOSIS — M54.41 CHRONIC BILATERAL LOW BACK PAIN WITH BILATERAL SCIATICA: ICD-10-CM

## 2025-06-12 DIAGNOSIS — G89.29 CHRONIC BILATERAL LOW BACK PAIN WITH BILATERAL SCIATICA: ICD-10-CM

## 2025-06-12 DIAGNOSIS — I10 ESSENTIAL HYPERTENSION: ICD-10-CM

## 2025-06-12 DIAGNOSIS — E66.01 OBESITY, MORBID (HCC): ICD-10-CM

## 2025-06-12 DIAGNOSIS — M54.42 CHRONIC BILATERAL LOW BACK PAIN WITH BILATERAL SCIATICA: ICD-10-CM

## 2025-06-12 DIAGNOSIS — E11.65 TYPE 2 DIABETES MELLITUS WITH HYPERGLYCEMIA, WITHOUT LONG-TERM CURRENT USE OF INSULIN (HCC): ICD-10-CM

## 2025-06-12 RX ORDER — OLMESARTAN MEDOXOMIL / AMLODIPINE BESYLATE / HYDROCHLOROTHIAZIDE 40; 10; 12.5 MG/1; MG/1; MG/1
1 TABLET, FILM COATED ORAL DAILY
Qty: 90 TABLET | Refills: 3 | Status: SHIPPED | OUTPATIENT
Start: 2025-06-12

## 2025-06-12 SDOH — ECONOMIC STABILITY: FOOD INSECURITY: WITHIN THE PAST 12 MONTHS, YOU WORRIED THAT YOUR FOOD WOULD RUN OUT BEFORE YOU GOT MONEY TO BUY MORE.: NEVER TRUE

## 2025-06-12 SDOH — ECONOMIC STABILITY: FOOD INSECURITY: WITHIN THE PAST 12 MONTHS, THE FOOD YOU BOUGHT JUST DIDN'T LAST AND YOU DIDN'T HAVE MONEY TO GET MORE.: NEVER TRUE

## 2025-06-12 ASSESSMENT — PATIENT HEALTH QUESTIONNAIRE - PHQ9
1. LITTLE INTEREST OR PLEASURE IN DOING THINGS: NOT AT ALL
SUM OF ALL RESPONSES TO PHQ QUESTIONS 1-9: 0
2. FEELING DOWN, DEPRESSED OR HOPELESS: NOT AT ALL

## 2025-07-02 ENCOUNTER — TELEPHONE (OUTPATIENT)
Dept: PRIMARY CARE CLINIC | Facility: CLINIC | Age: 55
End: 2025-07-02

## 2025-07-02 DIAGNOSIS — G89.29 CHRONIC BILATERAL LOW BACK PAIN WITH BILATERAL SCIATICA: Primary | ICD-10-CM

## 2025-07-02 DIAGNOSIS — M54.42 CHRONIC BILATERAL LOW BACK PAIN WITH BILATERAL SCIATICA: Primary | ICD-10-CM

## 2025-07-02 DIAGNOSIS — M54.41 CHRONIC BILATERAL LOW BACK PAIN WITH BILATERAL SCIATICA: Primary | ICD-10-CM

## 2025-07-02 NOTE — TELEPHONE ENCOUNTER
Second Request     Per Radha Mann at [facility] they would like for Dr. MAST to send a prescription for standard Rolator walker with the seat to be sent to [facility].    Fax number [phone]  Phone number [phone]

## 2025-07-03 ENCOUNTER — TELEMEDICINE (OUTPATIENT)
Dept: PRIMARY CARE CLINIC | Facility: CLINIC | Age: 55
End: 2025-07-03
Payer: MEDICARE

## 2025-07-03 DIAGNOSIS — I10 ESSENTIAL HYPERTENSION: ICD-10-CM

## 2025-07-03 DIAGNOSIS — Z09 HOSPITAL DISCHARGE FOLLOW-UP: Primary | ICD-10-CM

## 2025-07-03 DIAGNOSIS — J18.9 PNEUMONIA DUE TO INFECTIOUS ORGANISM, UNSPECIFIED LATERALITY, UNSPECIFIED PART OF LUNG: ICD-10-CM

## 2025-07-03 PROCEDURE — 1111F DSCHRG MED/CURRENT MED MERGE: CPT | Performed by: FAMILY MEDICINE

## 2025-07-03 PROCEDURE — 99214 OFFICE O/P EST MOD 30 MIN: CPT | Performed by: FAMILY MEDICINE

## 2025-07-03 RX ORDER — OLMESARTAN MEDOXOMIL / AMLODIPINE BESYLATE / HYDROCHLOROTHIAZIDE 40; 10; 12.5 MG/1; MG/1; MG/1
1 TABLET, FILM COATED ORAL DAILY
Qty: 90 TABLET | Refills: 3 | Status: SHIPPED | OUTPATIENT
Start: 2025-07-03

## 2025-07-03 RX ORDER — AMLODIPINE BESYLATE 10 MG/1
10 TABLET ORAL DAILY
COMMUNITY
Start: 2025-06-22 | End: 2025-07-03 | Stop reason: HOSPADM

## 2025-07-03 RX ORDER — HYDROCHLOROTHIAZIDE 25 MG/1
25 TABLET ORAL DAILY
COMMUNITY
Start: 2025-06-22 | End: 2025-07-03 | Stop reason: HOSPADM

## 2025-07-03 NOTE — PROGRESS NOTES
Post-Discharge Transitional Care Management Progress Note      Soto Townsend   YOB: 1970    Date of Office Visit:  7/3/2025  Date of Hospital Admission: 06/24/2025  Date of Hospital Discharge: 06/28/2025    Care management risk score Rising risk (score 2-5) and Complex Care (Scores >=6): No Risk Score On File     Non face to face  following discharge, date last encounter closed (first attempt may have been earlier): *No documented post hospital discharge outreach found in the last 14 days *No documented post hospital discharge outreach found in the last 14 days    Call initiated 2 business days of discharge: *No response recorded in the last 14 days    ASSESSMENT/PLAN:   Assessment & Plan  1. Hospital discharge follow-up  -     OR DISCHARGE MEDS RECONCILED W/ CURRENT OUTPATIENT MED LIST  2. Pneumonia due to infectious organism, unspecified laterality, unspecified part of lung  3. Essential hypertension    Assessment & Plan  1. Hypertension.  - Blood pressure has been elevated since recent hospitalization for pneumonia.  - Previously on a combination of olmesartan, amlodipine, and hydrochlorothiazide; switched to separate amlodipine and hydrochlorothiazide during hospital stay.  - Blood pressure at home was 172/>.  - Restarting olmesartan; advised to monitor blood pressure at home and report readings in a couple of weeks.    2. Recent hospitalization for pneumonia.  - Hospitalized from 07/01/2025 to 07/05/2025 for pneumonia.  - Specific type of pneumonia not disclosed.  - Advised to consider pneumococcal vaccine and RSV vaccine, which can be administered at a pharmacy.      Medical Decision Making: moderate complexity  No follow-ups on file.         Subjective:   Inpatient course: Discharge summary reviewed- see chart.    History of Present Illness  The patient presents via virtual visit for a follow-up.    He was hospitalized from Tuesday to Saturday due to pneumonia. During his hospital stay, his

## 2025-07-07 ENCOUNTER — TELEPHONE (OUTPATIENT)
Dept: PRIMARY CARE CLINIC | Facility: CLINIC | Age: 55
End: 2025-07-07

## 2025-07-07 NOTE — TELEPHONE ENCOUNTER
[facility] didn't receive notes, demographics or insurance.  Got disconnected before getting phone/fax number.  I don't see what was sent to [facility].

## 2025-07-10 ENCOUNTER — TELEPHONE (OUTPATIENT)
Dept: PRIMARY CARE CLINIC | Facility: CLINIC | Age: 55
End: 2025-07-10

## 2025-07-10 NOTE — TELEPHONE ENCOUNTER
Reggie Wiley with [facility] called and states that she spoke to someone on 7/2/25 in regards to the patient getting a rolator. Reggie Wiley states that she is going to the patient home for an appointment and he has not heard anything about his rolator. Reggie Wiley would like to know if someone would give her a call back in regards to what is going on. Please Advise.